# Patient Record
Sex: FEMALE | Race: WHITE | NOT HISPANIC OR LATINO | Employment: FULL TIME | URBAN - METROPOLITAN AREA
[De-identification: names, ages, dates, MRNs, and addresses within clinical notes are randomized per-mention and may not be internally consistent; named-entity substitution may affect disease eponyms.]

---

## 2018-11-02 ENCOUNTER — HOSPITAL ENCOUNTER (EMERGENCY)
Facility: HOSPITAL | Age: 58
Discharge: HOME | End: 2018-11-02
Attending: EMERGENCY MEDICINE
Payer: COMMERCIAL

## 2018-11-02 VITALS
OXYGEN SATURATION: 97 % | WEIGHT: 195 LBS | SYSTOLIC BLOOD PRESSURE: 134 MMHG | RESPIRATION RATE: 16 BRPM | DIASTOLIC BLOOD PRESSURE: 86 MMHG | BODY MASS INDEX: 27.92 KG/M2 | HEART RATE: 74 BPM | TEMPERATURE: 99 F | HEIGHT: 70 IN

## 2018-11-02 DIAGNOSIS — Z48.02 VISIT FOR SUTURE REMOVAL: Primary | ICD-10-CM

## 2018-11-02 PROCEDURE — 99282 EMERGENCY DEPT VISIT SF MDM: CPT

## 2018-11-02 RX ORDER — LEVOTHYROXINE SODIUM 100 UG/1
100 TABLET ORAL DAILY
COMMUNITY

## 2018-11-02 RX ORDER — ESTRADIOL 0.5 MG/1
0.5 TABLET ORAL DAILY
COMMUNITY

## 2018-11-02 NOTE — ED PROVIDER NOTES
Encounter Date: 11/2/2018    SCRIBE #1 NOTE: I, Radhames Weber, am scribing for, and in the presence of,  Toussaint Battley, FNP. I have scribed the following portions of the note - Other sections scribed: HPI, ROS, PE.       History     Chief Complaint   Patient presents with    Left facial sutures     Pt reports that her MD in [place] suggest that she have suture removed.  Sutures between left eye and nose x 1 week.     This is a 58 y.o. female who presents to the ED for a suture removal procedure.  The patient states that she had the sutures put in one week ago due to a basal cell carcinoma removal.  She had the procedure in [place] and her doctor suggested they be removed while she visited [place]. She denies any fever, chills, nausea, vomiting, or diarrhea. The patient has no other complaints or symptoms, and is only present for suture removal.      The history is provided by the patient.     Review of patient's allergies indicates:  No Known Allergies  Past Medical History:   Diagnosis Date    Thyroid disease      Past Surgical History:   Procedure Laterality Date    basal cell removal Left     facial     History reviewed. No pertinent family history.  Social History     Tobacco Use    Smoking status: Never Smoker    Smokeless tobacco: Never Used   Substance Use Topics    Alcohol use: Yes     Comment: occasionally    Drug use: No     Review of Systems   Constitutional: Negative for chills and fever.   Gastrointestinal: Negative for diarrhea, nausea and vomiting.   Skin:        Positive for a healing wound to the left cheek   All other systems reviewed and are negative.      Physical Exam     Initial Vitals [11/02/18 1423]   BP Pulse Resp Temp SpO2   134/86 74 16 98.7 °F (37.1 °C) 97 %      MAP       --         Physical Exam    Nursing note and vitals reviewed.  Constitutional: She appears well-developed and well-nourished.   HENT:   Head: Normocephalic and atraumatic.   Right Ear: External ear  normal.   Left Ear: External ear normal.   Nose: Nose normal.   Eyes: Conjunctivae are normal.   Neck: Normal range of motion. Neck supple.   Cardiovascular: Normal rate.   Pulmonary/Chest: Effort normal. No respiratory distress.   Musculoskeletal: Normal range of motion.   Neurological: She is alert and oriented to person, place, and time.   Skin: Skin is warm and dry. Capillary refill takes less than 2 seconds. No rash noted.   Healing scar to the left cheek.   Psychiatric: She has a normal mood and affect. Her behavior is normal.         ED Course   Suture Removal  Date/Time: 11/2/2018 2:28 PM  Location procedure was performed: [facility] EMERGENCY DEPARTMENT  Performed by: Toussaint Battley III, FNP  Authorized by: Kelley Carey DO   Body area: head/neck  Location details: left cheek  Wound Appearance: clean and well healed  Sutures removed: 1 running stich removed.  Complications: No  Estimated blood loss (mL): 0  Specimens: No  Implants: No  Patient tolerance: Patient tolerated the procedure well with no immediate complications        Labs Reviewed - No data to display       Imaging Results    None          Medical Decision Making:   Initial Assessment:   Suture removal  Differential Diagnosis:   Infection  ED Management:  Patient will be discharged home on a derma to prevent scarring.  Patient is instructed to follow up with her dermatologist next week and return to the ER as needed if symptoms worsen or fail to improve.  Patient verbalized understanding of discharge instruction and treatment plan.            Scribe Attestation:   Scribe #1: I performed the above scribed service and the documentation accurately describes the services I performed. I attest to the accuracy of the note.               Clinical Impression:     1. Visit for suture removal                                   Toussaint Battley III, FNP  11/02/18 [id number]